# Patient Record
Sex: MALE | Race: WHITE | NOT HISPANIC OR LATINO
[De-identification: names, ages, dates, MRNs, and addresses within clinical notes are randomized per-mention and may not be internally consistent; named-entity substitution may affect disease eponyms.]

---

## 2023-07-31 ENCOUNTER — APPOINTMENT (OUTPATIENT)
Dept: ORTHOPEDIC SURGERY | Facility: CLINIC | Age: 23
End: 2023-07-31
Payer: COMMERCIAL

## 2023-07-31 VITALS — WEIGHT: 215 LBS | HEIGHT: 73 IN | BODY MASS INDEX: 28.49 KG/M2

## 2023-07-31 DIAGNOSIS — Z78.9 OTHER SPECIFIED HEALTH STATUS: ICD-10-CM

## 2023-07-31 PROCEDURE — 99203 OFFICE O/P NEW LOW 30 MIN: CPT

## 2023-07-31 PROCEDURE — 73080 X-RAY EXAM OF ELBOW: CPT | Mod: RT

## 2023-08-01 NOTE — HISTORY OF PRESENT ILLNESS
[de-identified] : Pain in the right elbow for about a month now. Initially believes it started in the forearm with some tightness when pitching. 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 He then began to have some pain on the lateral side of the elbow when practicing and felt the pain with his release point of a pitch. Initially had numbness and tingling into the fingers for about a hour.

## 2023-08-01 NOTE — DISCUSSION/SUMMARY
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 Xray of right elbow was unremarkable. No gross instability of the bone    Recommend patient obtain mri right elbow to rule out UCL tear which is consistent with pain, discussed high morbidity of ucl surgery and options and risk 45 minutre face to face Start PT Prescribed patient motrin 600mgs and discussed risks of side effects and timing and management of medication. Side effects include but are not limited to gi ulcers and irritation, as well as kidney failure and bleeding issues. f/u after MRI/1 week

## 2023-08-02 ENCOUNTER — APPOINTMENT (OUTPATIENT)
Dept: MRI IMAGING | Facility: CLINIC | Age: 23
End: 2023-08-02
Payer: COMMERCIAL

## 2023-08-02 PROCEDURE — 73221 MRI JOINT UPR EXTREM W/O DYE: CPT | Mod: RT

## 2023-08-07 ENCOUNTER — TRANSCRIPTION ENCOUNTER (OUTPATIENT)
Age: 23
End: 2023-08-07

## 2023-08-07 ENCOUNTER — APPOINTMENT (OUTPATIENT)
Dept: ORTHOPEDIC SURGERY | Facility: CLINIC | Age: 23
End: 2023-08-07
Payer: COMMERCIAL

## 2023-08-07 VITALS — WEIGHT: 215 LBS | HEIGHT: 73 IN | BODY MASS INDEX: 28.49 KG/M2

## 2023-08-07 PROCEDURE — 99215 OFFICE O/P EST HI 40 MIN: CPT

## 2023-08-08 NOTE — DISCUSSION/SUMMARY
[Medication Risks Reviewed] : Medication risks reviewed [Surgical risks reviewed] : Surgical risks reviewed [de-identified] : MRI of the right elbow with high-grade partial tearing of the proximal UCL We reviewed the mri findings. We discussed treatment options, both operative and non operative. I do think he would be a candidate for surgery. Pain relief is a goal as well as improving function and motion. 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  Discussed starting with a course of conservative treatment.   Discussed risks and benefits of prp, the date on efficacy and fda's position and that in some ways hasn't been proven, understands the risks, out of pocket expense as insurance doesn't cover and alternative treatments continue with Physical therapy discussed a throwing program  Discussed brace use.  had extensive discussion regarding ucl reconstructive surgery however this would have him out for his last year and given morbiidty , recovery time and risks he doesnt want to proceed with that discussed ligament brace surgery which is newer so less long term follow up, higher risk of failure but will allow him time to return to pitcing his last year wants to proced The risks and benefits of surgery have been discussed. Risks include but are not limited to bleeding, infection, reaction to anesthesia, injury to blood vessels and nerves, malunion, nonunion, DVT, PE, necessity of repeat surgery, chronic pain, loss of limb and death. The patient understands the risks and agrees with the surgical plan. All questions have been answered. 45 minutes face to face

## 2023-08-08 NOTE — PHYSICAL EXAM
[5___] : pronation 5[unfilled]/5 [Right] : right elbow [] : no palpable mass [FreeTextEntry1] : unremarkable

## 2023-08-08 NOTE — DATA REVIEWED
[MRI] : MRI [Right] : of the right [Elbow] : elbow [Report was reviewed and noted in the chart] : The report was reviewed and noted in the chart [I independently reviewed and interpreted images and report] : I independently reviewed and interpreted images and report [I reviewed the films/CD] : I reviewed the films/CD [FreeTextEntry1] : high-grade partial tearing of the proximal UCL

## 2023-08-08 NOTE — HISTORY OF PRESENT ILLNESS
[de-identified] : Pt is here for an MRI review of the right elbow. Notes an improvement in the right elbow since last visit. Has been going to PT for the right elbow, would like a new script if possible. Has pain in the right elbow when turning the right arm and with bending the arm. Takes advil as needed.

## 2023-08-10 ENCOUNTER — APPOINTMENT (OUTPATIENT)
Dept: ORTHOPEDIC SURGERY | Facility: CLINIC | Age: 23
End: 2023-08-10

## 2023-08-17 ENCOUNTER — APPOINTMENT (OUTPATIENT)
Age: 23
End: 2023-08-17
Payer: COMMERCIAL

## 2023-08-17 PROCEDURE — 24345 REPR ELBW MED LIGMNT W/TISSU: CPT | Mod: RT

## 2023-08-17 PROCEDURE — 24345 REPR ELBW MED LIGMNT W/TISSU: CPT | Mod: AS,RT

## 2023-08-23 ENCOUNTER — APPOINTMENT (OUTPATIENT)
Dept: ORTHOPEDIC SURGERY | Facility: CLINIC | Age: 23
End: 2023-08-23
Payer: COMMERCIAL

## 2023-08-23 PROCEDURE — 99024 POSTOP FOLLOW-UP VISIT: CPT

## 2023-08-23 PROCEDURE — 73070 X-RAY EXAM OF ELBOW: CPT | Mod: RT

## 2023-08-28 NOTE — PHYSICAL EXAM
[Right] : right elbow [] : no purulent drainage [FreeTextEntry9] : Limited secondary to recent surgery - stable throughout range of motion. [de-identified] : Limited due to recent surgery

## 2023-08-28 NOTE — DISCUSSION/SUMMARY
[de-identified] : The patient is approximately 5 days s/p right elbow UCL repair with internal brace (DOS: 08/17/2023) - normal course with good progress and no evidence of infection. Incision sites are well approximated, clean, dry, intact, without drainage, without erythema. The patient is instructed in wound management.  The patient's post-op plan, protocol and activity modifications have been thoroughly discussed and the patient expressed understanding.  Splint removed and X-Rays taken in office today, all in proper anatomical positioning, components well fixed.   Patient was re-splinted in office   start physical therapy  Follow up in 1 week for splint removal and discuss starting physical therapy.

## 2023-08-28 NOTE — HISTORY OF PRESENT ILLNESS
[de-identified] : Patient is here for a post op visit on the right elbow. 8/17/23 UCL repair. Patient notes he is feeling fine. [employer].

## 2023-08-30 ENCOUNTER — APPOINTMENT (OUTPATIENT)
Dept: ORTHOPEDIC SURGERY | Facility: CLINIC | Age: 23
End: 2023-08-30
Payer: COMMERCIAL

## 2023-08-30 VITALS — BODY MASS INDEX: 28.49 KG/M2 | HEIGHT: 73 IN | WEIGHT: 215 LBS

## 2023-08-30 PROCEDURE — L3761: CPT | Mod: RT

## 2023-08-30 PROCEDURE — 99024 POSTOP FOLLOW-UP VISIT: CPT

## 2023-09-06 NOTE — PHYSICAL EXAM
[Right] : right elbow [] : no purulent drainage [FreeTextEntry9] : 90/90, limited secondary to recent surgery - stable throughout range of motion. [de-identified] : Good  strength  [de-identified] : Limited due to recent surgery

## 2023-09-06 NOTE — DISCUSSION/SUMMARY
[de-identified] : The patient is approximately 2 weeks s/p right elbow UCL repair with internal brace (DOS: 08/17/2023) - normal course with good progress and no evidence of infection. Incision sites are well approximated, clean, dry, intact, without drainage, without erythema. The patient is instructed in wound management. The patient's post-op plan, protocol and activity modifications have been thoroughly discussed and the patient expressed understanding.  The patient will begin use of Elbow ROM brace to ensure stability and post-operative healing. Work on passive ROM this week. 0-70 passive ROM increase  next Wednesday.   Resolving ulnar neuropathy, will resolve with time and rehab and swelling goes down.   Continue physical therapy  Follow up in 2 weeks

## 2023-09-06 NOTE — HISTORY OF PRESENT ILLNESS
[de-identified] : Post op on right elbow UCL Repair on 8/17/23. Feeling well today. No issues with the area since last week. Has been in the splint as well. will be switching to a Ashley brace today locked at 90

## 2023-09-13 ENCOUNTER — APPOINTMENT (OUTPATIENT)
Dept: ORTHOPEDIC SURGERY | Facility: CLINIC | Age: 23
End: 2023-09-13
Payer: COMMERCIAL

## 2023-09-13 VITALS — WEIGHT: 215 LBS | HEIGHT: 73 IN | BODY MASS INDEX: 28.49 KG/M2

## 2023-09-13 PROCEDURE — 99024 POSTOP FOLLOW-UP VISIT: CPT

## 2023-09-13 RX ORDER — OXYCODONE AND ACETAMINOPHEN 10; 325 MG/1; MG/1
10-325 TABLET ORAL
Qty: 40 | Refills: 0 | Status: DISCONTINUED | COMMUNITY
Start: 2023-08-17 | End: 2023-09-13

## 2023-10-11 ENCOUNTER — APPOINTMENT (OUTPATIENT)
Dept: ORTHOPEDIC SURGERY | Facility: CLINIC | Age: 23
End: 2023-10-11
Payer: COMMERCIAL

## 2023-10-11 VITALS — WEIGHT: 215 LBS | HEIGHT: 73 IN | BODY MASS INDEX: 28.49 KG/M2

## 2023-10-11 PROCEDURE — 99024 POSTOP FOLLOW-UP VISIT: CPT

## 2023-11-08 ENCOUNTER — APPOINTMENT (OUTPATIENT)
Dept: ORTHOPEDIC SURGERY | Facility: CLINIC | Age: 23
End: 2023-11-08
Payer: COMMERCIAL

## 2023-11-08 VITALS — BODY MASS INDEX: 28.49 KG/M2 | HEIGHT: 73 IN | WEIGHT: 215 LBS

## 2023-11-08 PROCEDURE — 99024 POSTOP FOLLOW-UP VISIT: CPT

## 2023-12-06 ENCOUNTER — APPOINTMENT (OUTPATIENT)
Dept: ORTHOPEDIC SURGERY | Facility: CLINIC | Age: 23
End: 2023-12-06
Payer: COMMERCIAL

## 2023-12-06 VITALS — BODY MASS INDEX: 28.49 KG/M2 | WEIGHT: 215 LBS | HEIGHT: 73 IN

## 2023-12-06 PROCEDURE — 99213 OFFICE O/P EST LOW 20 MIN: CPT

## 2024-01-03 ENCOUNTER — APPOINTMENT (OUTPATIENT)
Dept: ORTHOPEDIC SURGERY | Facility: CLINIC | Age: 24
End: 2024-01-03
Payer: COMMERCIAL

## 2024-01-03 VITALS — HEIGHT: 73 IN | BODY MASS INDEX: 28.49 KG/M2 | WEIGHT: 215 LBS

## 2024-01-03 PROCEDURE — 99213 OFFICE O/P EST LOW 20 MIN: CPT

## 2024-03-04 ENCOUNTER — APPOINTMENT (OUTPATIENT)
Dept: ORTHOPEDIC SURGERY | Facility: CLINIC | Age: 24
End: 2024-03-04
Payer: COMMERCIAL

## 2024-03-04 VITALS — HEIGHT: 73 IN | BODY MASS INDEX: 28.49 KG/M2 | WEIGHT: 215 LBS

## 2024-03-04 DIAGNOSIS — S53.449A ULNAR COLLATERAL LIGAMENT SPRAIN OF UNSPECIFIED ELBOW, INITIAL ENCOUNTER: ICD-10-CM

## 2024-03-04 PROCEDURE — 99213 OFFICE O/P EST LOW 20 MIN: CPT

## 2024-03-04 NOTE — HISTORY OF PRESENT ILLNESS
[de-identified] : Patient is here for a follow up appointment for the right elbow. Patient states pain has improved since the previous visit. Patient states he is not currently experiencing any pain. Patient is no longer attending physical therapy.

## 2024-03-04 NOTE — DISCUSSION/SUMMARY
[de-identified] : The patient is approximately 6 months s/p right elbow UCL repair with internal brace (DOS: 08/17/2023) - normal course with good progress and no evidence of infection. Incision sites are well healed. The patient's post-op plan, protocol and activity modifications have been thoroughly discussed and the patient expressed understanding.  Overall, the patent is progressing well, has been gradually increasing activity including throwing without increased pain or difficulty.  Pt can continue with gradual increase in activity as tolerated f/up prn

## 2024-03-04 NOTE — PHYSICAL EXAM
[Right] : right elbow [NL (150)] : flexion 150 degrees [NL (0)] : extension 0 degrees [NL (90)] : supination 90 degrees [5___] : extension 5[unfilled]/5 [] : no palpable mass [FreeTextEntry9] : Stable throughout range of motion.